# Patient Record
Sex: FEMALE | Race: ASIAN | NOT HISPANIC OR LATINO | URBAN - METROPOLITAN AREA
[De-identification: names, ages, dates, MRNs, and addresses within clinical notes are randomized per-mention and may not be internally consistent; named-entity substitution may affect disease eponyms.]

---

## 2022-04-24 ENCOUNTER — EMERGENCY (EMERGENCY)
Facility: HOSPITAL | Age: 57
LOS: 1 days | Discharge: HOME | End: 2022-04-24
Attending: EMERGENCY MEDICINE | Admitting: STUDENT IN AN ORGANIZED HEALTH CARE EDUCATION/TRAINING PROGRAM
Payer: MEDICAID

## 2022-04-24 VITALS
HEART RATE: 58 BPM | DIASTOLIC BLOOD PRESSURE: 70 MMHG | RESPIRATION RATE: 18 BRPM | SYSTOLIC BLOOD PRESSURE: 141 MMHG | OXYGEN SATURATION: 100 % | TEMPERATURE: 98 F

## 2022-04-24 VITALS
HEART RATE: 51 BPM | SYSTOLIC BLOOD PRESSURE: 128 MMHG | RESPIRATION RATE: 16 BRPM | TEMPERATURE: 98 F | OXYGEN SATURATION: 100 % | DIASTOLIC BLOOD PRESSURE: 62 MMHG

## 2022-04-24 LAB
ALBUMIN SERPL ELPH-MCNC: 4.6 G/DL — SIGNIFICANT CHANGE UP (ref 3.3–5)
ALP SERPL-CCNC: 118 U/L — SIGNIFICANT CHANGE UP (ref 40–120)
ALT FLD-CCNC: 15 U/L — SIGNIFICANT CHANGE UP (ref 4–33)
ANION GAP SERPL CALC-SCNC: 12 MMOL/L — SIGNIFICANT CHANGE UP (ref 7–14)
APPEARANCE UR: CLEAR — SIGNIFICANT CHANGE UP
AST SERPL-CCNC: 18 U/L — SIGNIFICANT CHANGE UP (ref 4–32)
BACTERIA # UR AUTO: NEGATIVE — SIGNIFICANT CHANGE UP
BASOPHILS # BLD AUTO: 0.07 K/UL — SIGNIFICANT CHANGE UP (ref 0–0.2)
BASOPHILS NFR BLD AUTO: 0.8 % — SIGNIFICANT CHANGE UP (ref 0–2)
BILIRUB SERPL-MCNC: 0.3 MG/DL — SIGNIFICANT CHANGE UP (ref 0.2–1.2)
BILIRUB UR-MCNC: NEGATIVE — SIGNIFICANT CHANGE UP
BUN SERPL-MCNC: 11 MG/DL — SIGNIFICANT CHANGE UP (ref 7–23)
CALCIUM SERPL-MCNC: 9.7 MG/DL — SIGNIFICANT CHANGE UP (ref 8.4–10.5)
CHLORIDE SERPL-SCNC: 100 MMOL/L — SIGNIFICANT CHANGE UP (ref 98–107)
CHOLEST SERPL-MCNC: 177 MG/DL — SIGNIFICANT CHANGE UP
CO2 SERPL-SCNC: 25 MMOL/L — SIGNIFICANT CHANGE UP (ref 22–31)
COLOR SPEC: COLORLESS — SIGNIFICANT CHANGE UP
CREAT SERPL-MCNC: 0.6 MG/DL — SIGNIFICANT CHANGE UP (ref 0.5–1.3)
DIFF PNL FLD: NEGATIVE — SIGNIFICANT CHANGE UP
EGFR: 104 ML/MIN/1.73M2 — SIGNIFICANT CHANGE UP
EOSINOPHIL # BLD AUTO: 0.45 K/UL — SIGNIFICANT CHANGE UP (ref 0–0.5)
EOSINOPHIL NFR BLD AUTO: 4.8 % — SIGNIFICANT CHANGE UP (ref 0–6)
FLUAV AG NPH QL: SIGNIFICANT CHANGE UP
FLUBV AG NPH QL: SIGNIFICANT CHANGE UP
GLUCOSE SERPL-MCNC: 197 MG/DL — HIGH (ref 70–99)
GLUCOSE UR QL: ABNORMAL
HCT VFR BLD CALC: 40.6 % — SIGNIFICANT CHANGE UP (ref 34.5–45)
HDLC SERPL-MCNC: 49 MG/DL — LOW
HGB BLD-MCNC: 12.9 G/DL — SIGNIFICANT CHANGE UP (ref 11.5–15.5)
IANC: 5.77 K/UL — SIGNIFICANT CHANGE UP (ref 1.8–7.4)
IMM GRANULOCYTES NFR BLD AUTO: 0.3 % — SIGNIFICANT CHANGE UP (ref 0–1.5)
KETONES UR-MCNC: NEGATIVE — SIGNIFICANT CHANGE UP
LEUKOCYTE ESTERASE UR-ACNC: NEGATIVE — SIGNIFICANT CHANGE UP
LIPID PNL WITH DIRECT LDL SERPL: 107 MG/DL — HIGH
LYMPHOCYTES # BLD AUTO: 2.01 K/UL — SIGNIFICANT CHANGE UP (ref 1–3.3)
LYMPHOCYTES # BLD AUTO: 21.7 % — SIGNIFICANT CHANGE UP (ref 13–44)
MCHC RBC-ENTMCNC: 27.6 PG — SIGNIFICANT CHANGE UP (ref 27–34)
MCHC RBC-ENTMCNC: 31.8 GM/DL — LOW (ref 32–36)
MCV RBC AUTO: 86.8 FL — SIGNIFICANT CHANGE UP (ref 80–100)
MONOCYTES # BLD AUTO: 0.95 K/UL — HIGH (ref 0–0.9)
MONOCYTES NFR BLD AUTO: 10.2 % — SIGNIFICANT CHANGE UP (ref 2–14)
NEUTROPHILS # BLD AUTO: 5.77 K/UL — SIGNIFICANT CHANGE UP (ref 1.8–7.4)
NEUTROPHILS NFR BLD AUTO: 62.2 % — SIGNIFICANT CHANGE UP (ref 43–77)
NITRITE UR-MCNC: NEGATIVE — SIGNIFICANT CHANGE UP
NON HDL CHOLESTEROL: 128 MG/DL — SIGNIFICANT CHANGE UP
NRBC # BLD: 0 /100 WBCS — SIGNIFICANT CHANGE UP
NRBC # FLD: 0 K/UL — SIGNIFICANT CHANGE UP
PH UR: 6.5 — SIGNIFICANT CHANGE UP (ref 5–8)
PLATELET # BLD AUTO: 250 K/UL — SIGNIFICANT CHANGE UP (ref 150–400)
POTASSIUM SERPL-MCNC: 3.7 MMOL/L — SIGNIFICANT CHANGE UP (ref 3.5–5.3)
POTASSIUM SERPL-SCNC: 3.7 MMOL/L — SIGNIFICANT CHANGE UP (ref 3.5–5.3)
PROT SERPL-MCNC: 8.3 G/DL — SIGNIFICANT CHANGE UP (ref 6–8.3)
PROT UR-MCNC: NEGATIVE — SIGNIFICANT CHANGE UP
RBC # BLD: 4.68 M/UL — SIGNIFICANT CHANGE UP (ref 3.8–5.2)
RBC # FLD: 13.7 % — SIGNIFICANT CHANGE UP (ref 10.3–14.5)
RBC CASTS # UR COMP ASSIST: SIGNIFICANT CHANGE UP /HPF (ref 0–4)
RSV RNA NPH QL NAA+NON-PROBE: SIGNIFICANT CHANGE UP
SARS-COV-2 RNA SPEC QL NAA+PROBE: SIGNIFICANT CHANGE UP
SODIUM SERPL-SCNC: 137 MMOL/L — SIGNIFICANT CHANGE UP (ref 135–145)
SP GR SPEC: 1 — SIGNIFICANT CHANGE UP (ref 1–1.05)
TRIGL SERPL-MCNC: 105 MG/DL — SIGNIFICANT CHANGE UP
TROPONIN T, HIGH SENSITIVITY RESULT: <6 NG/L — SIGNIFICANT CHANGE UP
TROPONIN T, HIGH SENSITIVITY RESULT: <6 NG/L — SIGNIFICANT CHANGE UP
UROBILINOGEN FLD QL: SIGNIFICANT CHANGE UP
WBC # BLD: 9.28 K/UL — SIGNIFICANT CHANGE UP (ref 3.8–10.5)
WBC # FLD AUTO: 9.28 K/UL — SIGNIFICANT CHANGE UP (ref 3.8–10.5)
WBC UR QL: SIGNIFICANT CHANGE UP /HPF (ref 0–5)

## 2022-04-24 PROCEDURE — 71046 X-RAY EXAM CHEST 2 VIEWS: CPT | Mod: 26

## 2022-04-24 PROCEDURE — 78452 HT MUSCLE IMAGE SPECT MULT: CPT | Mod: 26,ME

## 2022-04-24 PROCEDURE — 93016 CV STRESS TEST SUPVJ ONLY: CPT | Mod: GC

## 2022-04-24 PROCEDURE — 99236 HOSP IP/OBS SAME DATE HI 85: CPT

## 2022-04-24 PROCEDURE — 93306 TTE W/DOPPLER COMPLETE: CPT | Mod: 26

## 2022-04-24 PROCEDURE — 93018 CV STRESS TEST I&R ONLY: CPT | Mod: GC

## 2022-04-24 RX ORDER — ONDANSETRON 8 MG/1
4 TABLET, FILM COATED ORAL ONCE
Refills: 0 | Status: DISCONTINUED | OUTPATIENT
Start: 2022-04-24 | End: 2022-04-27

## 2022-04-24 RX ORDER — ASPIRIN/CALCIUM CARB/MAGNESIUM 324 MG
324 TABLET ORAL ONCE
Refills: 0 | Status: COMPLETED | OUTPATIENT
Start: 2022-04-24 | End: 2022-04-24

## 2022-04-24 RX ORDER — INSULIN LISPRO 100/ML
VIAL (ML) SUBCUTANEOUS AT BEDTIME
Refills: 0 | Status: DISCONTINUED | OUTPATIENT
Start: 2022-04-24 | End: 2022-04-27

## 2022-04-24 RX ORDER — AMLODIPINE BESYLATE 2.5 MG/1
5 TABLET ORAL DAILY
Refills: 0 | Status: DISCONTINUED | OUTPATIENT
Start: 2022-04-24 | End: 2022-04-27

## 2022-04-24 RX ORDER — FAMOTIDINE 10 MG/ML
20 INJECTION INTRAVENOUS ONCE
Refills: 0 | Status: COMPLETED | OUTPATIENT
Start: 2022-04-24 | End: 2022-04-24

## 2022-04-24 RX ORDER — ASPIRIN/CALCIUM CARB/MAGNESIUM 324 MG
81 TABLET ORAL DAILY
Refills: 0 | Status: DISCONTINUED | OUTPATIENT
Start: 2022-04-25 | End: 2022-04-27

## 2022-04-24 RX ORDER — ATORVASTATIN CALCIUM 80 MG/1
40 TABLET, FILM COATED ORAL DAILY
Refills: 0 | Status: DISCONTINUED | OUTPATIENT
Start: 2022-04-24 | End: 2022-04-27

## 2022-04-24 RX ORDER — INSULIN LISPRO 100/ML
VIAL (ML) SUBCUTANEOUS
Refills: 0 | Status: DISCONTINUED | OUTPATIENT
Start: 2022-04-24 | End: 2022-04-27

## 2022-04-24 RX ADMIN — Medication 30 MILLILITER(S): at 04:49

## 2022-04-24 RX ADMIN — FAMOTIDINE 20 MILLIGRAM(S): 10 INJECTION INTRAVENOUS at 04:49

## 2022-04-24 RX ADMIN — Medication 324 MILLIGRAM(S): at 04:49

## 2022-04-24 RX ADMIN — Medication 1: at 13:04

## 2022-04-24 NOTE — ED PROVIDER NOTE - NS ED ROS FT
General: +Decreased appetite. Denies fevers  Eyes: Denies vision changes  ENMT: Denies sore throat  CV: +chest pain  Resp: Denies SOB  GI: +Nausea. Denies abdominal pain, vomiting, diarrhea  : +painful urination  Skin: Denies new rashes  Neuro: Denies headache, focal weakness  MSK: Denies recent trauma

## 2022-04-24 NOTE — ED CDU PROVIDER INITIAL DAY NOTE - ATTENDING APP SHARED VISIT CONTRIBUTION OF CARE
55 y/o female with pmhx of DM on insulin and metformin, UTI, presents to ED c/o burning with urination and fever x 4 days. Pt states she has been getting UTIs every month. States she skipped her insulin today because she wasn't feeling well. No hematuria. Pt states she has been seeing her PMD for UTIs and currently taking two days of doxycycline and bactrim, does not know urine results. Pt poor historian. No weight loss. +vomiting and diarrhea. Admits to vaginal discharge x 1 month. Not sexually active.  Pt accepted to cdu for IV abx and endo consult. Hgb a1c 9.7.

## 2022-04-24 NOTE — ED ADULT NURSE REASSESSMENT NOTE - NS ED NURSE REASSESS COMMENT FT1
Daughter at bedside. Patient received from ER. Patient is Arabic speaking and wants her Daughter to translate for her. Patient stated she is feeling better and offered no complaints. Patient placed on cardiac monitor. VS stable. No distress noted. Nursing care continues

## 2022-04-24 NOTE — ED CDU PROVIDER INITIAL DAY NOTE - PHYSICAL EXAMINATION
CONSTITUTIONAL:  Well appearing, awake, alert, oriented to person, place, time/situation and in no apparent distress.  Pt. is objectively comfortable appearing and verbalizing in full, clear, effortless sentences.  ENMT: NC/AT.  Airway patent.  Nasal mucosa clear.  Moist mucous membranes.  Neck supple.  EYES:  Clear OU.  CARDIAC:  Normal rate, regular rhythm.  Heart sounds S1 S2.  No murmurs, gallops, or rubs.  RESPIRATORY:  Breath sounds clear and equal bilaterally.  No wheezes, no rales, no rhonchi.  GASTROINTESTINAL:  Abdomen soft, non-distended, non-tender.  No rebound, no guarding.  NEUROLOGICAL:  Alert and oriented to person/place/time/situation.  No focal deficits; no tremors noted.   MUSCULOSKELETAL:  Range of motion is not limited.    SKIN:  Skin color unremarkable.  Skin warm, dry, and intact.    PSYCHIATRIC:  Alert and oriented to person/place/time/situation.  Mood and affect WNL.  No apparent risk to self or others.

## 2022-04-24 NOTE — ED CDU PROVIDER INITIAL DAY NOTE - NS ED ATTENDING STATEMENT MOD
This was a shared visit with the KERI. I reviewed and verified the documentation and independently performed the documented:

## 2022-04-24 NOTE — ED PROVIDER NOTE - OBJECTIVE STATEMENT
56F PMH HTN, HLD, DM, on ASA p/w L CP, dysuria x hours. Pt reports burning L CP x hours, constant, non -radiating, non-exertional. A/w nausea, decreased appetite. No fevers, vomiting, SOB, URI sx. Also reports dysuria beginning at same time. Denies abd pain, flank pain. 56F PMH HTN, HLD, DM, on ASA p/w L CP, dysuria x hours. Pt reports burning L CP x hours (began ~7:30-8pm), constant, non -radiating, non-exertional. A/w nausea, decreased appetite. No fevers, vomiting, SOB, URI sx. Also reports dysuria beginning at same time. Denies abd pain, flank pain. Reports FH MI in siblings ~age 60s).

## 2022-04-24 NOTE — ED CDU PROVIDER DISPOSITION NOTE - NS_EDPROVIDERDISPOUSERTYPE_ED_A_ED
Pt was in the middle of her 3hr gtt and  said that they saw her leaving - pts are not supposed to leave the building during this test. Called pt and she said that she just went downstairs because the 2 TVs and the [employer] music was too much for her while in our waiting room. She states that she will be back upstairs in time for her 9:34 draw.   
Attending Attestation (For Attendings USE Only)...

## 2022-04-24 NOTE — ED CDU PROVIDER DISPOSITION NOTE - NSFOLLOWUPINSTRUCTIONS_ED_ALL_ED_FT
Rest, drink plenty of fluids.  Advance activity as tolerated.  Continue all previously prescribed medications as directed.  Follow up with your primary care physician and cardiologist in 48-72 hours- bring copies of your results. Call on referral list given for next available appointment. Return to the ER for worsening or persistent symptoms, and/or ANY NEW OR CONCERNING SYMPTOMS. If you have issues obtaining follow up, please call: [phone] to obtain a doctor or specialist who takes your insurance in your area.

## 2022-04-24 NOTE — ED CDU PROVIDER INITIAL DAY NOTE - ASSESSMENT PLAN
Telemetry/Stress Test/Echocardiogram MEDICATIONS  (PRN):  acetaminophen   Tablet .. 650 milliGRAM(s) Oral every 6 hours PRN Mild Pain (1 - 3), Moderate Pain (4 - 6)  chlorproMAZINE    Injectable 100 milliGRAM(s) IntraMuscular once PRN agitation or aggression  chlorproMAZINE    Tablet 100 milliGRAM(s) Oral every 4 hours PRN agitation or aggression  diphenhydrAMINE   Injectable 25 milliGRAM(s) IntraMuscular every 12 hours PRN EPS ppx  diphenhydrAMINE   Injectable 25 milliGRAM(s) IntraMuscular every 12 hours PRN EPS ppx  haloperidol    Injectable 5 milliGRAM(s) IntraMuscular every 12 hours PRN psychosis  haloperidol    Injectable 5 milliGRAM(s) IntraMuscular every 12 hours PRN psychosis  melatonin. 5 milliGRAM(s) Oral at bedtime PRN Insomnia  polyethylene glycol 3350 17 Gram(s) Oral daily PRN constipation  senna 2 Tablet(s) Oral at bedtime PRN constipation

## 2022-04-24 NOTE — ED CDU PROVIDER DISPOSITION NOTE - PATIENT PORTAL LINK FT
You can access the FollowMyHealth Patient Portal offered by [facility] by registering at the following website: [email or web address]. By joining [facility]’s FollowMyHealth portal, you will also be able to view your health information using other applications (apps) compatible with our system.

## 2022-04-24 NOTE — ED CDU PROVIDER DISPOSITION NOTE - ATTENDING CONTRIBUTION TO CARE
55 yo f past medical history diabetes, htn, hld presents with atypical CP as well as some dysuria. ua neg. trop neg x 2. Patient in cdu for further cardiac testing. echo and stress without significant abnormality. patient symptoms improved. patient stable for dc with out-patient follow up. Return precautions were discussed with patient at bedside and patient expressed understanding.

## 2022-04-24 NOTE — ED PROVIDER NOTE - PROGRESS NOTE DETAILS
Billy, PGY3 - pt reports improvement in CP. Initial Trop <6. Pt agreeable to staying in CDU for stress/echo, d/w CDU

## 2022-04-24 NOTE — ED CDU PROVIDER INITIAL DAY NOTE - OBJECTIVE STATEMENT
56F PMH HTN, HLD, DM, on ASA p/w L CP, dysuria x hours. Pt reports burning L CP x hours (began ~7:30-8pm), constant, non -radiating, non-exertional. A/w nausea, decreased appetite. No fevers, vomiting, SOB, URI sx. Also reports dysuria beginning at same time. Denies abd pain, flank pain. Reports FH MI in siblings ~age 60s).    CDU JAMES Maher Note------  ED Provider HPI as above, reviewed.  Pt is a 55 yo female, PMH DM, HTN, hyperlipidemia; presented to the ED c/o left chest pain, described as burning, non-radiating, and dysuria, all symptoms onset was 4/23 evening.  No hx/o fevers, chills, abdominal pain, SOB, back pain.  Pt has family hx/o MI.  In the ED, EKG sinus nathalia with no acute/ischemic findings.  Initial troponin was <6, second troponin currently testing, along with lipid profile.  CBC and CMP grossly unremarkable aside from serum glucose of 197; lipase was 88.  RSV/Flu/COVID were negative.  UA negative leuk/nitrites.  CXR with no acute pathology noted.  Pt. was dispo'd to CDU for continued care plan:  Tele monitoring, stress test, echo, Tele Doc of Day evaluation, general observation care / monitoring. 56F PMH HTN, HLD, DM, on ASA p/w L CP, dysuria x hours. Pt reports burning L CP x hours (began ~7:30-8pm), constant, non -radiating, non-exertional. A/w nausea, decreased appetite. No fevers, vomiting, SOB, URI sx. Also reports dysuria beginning at same time. Denies abd pain, flank pain. Reports FH MI in siblings ~age 60s).    CDU JAMES Maher Note------  ED Provider HPI as above, reviewed.  Pt is a 57 yo female, PMH DM, HTN, hyperlipidemia; presented to the ED c/o left chest pain, described as burning, non-radiating, and dysuria, all symptoms onset was 4/23 evening.  No hx/o fevers, chills, abdominal pain, SOB, back pain.  Pt has family hx/o MI.  In the ED, EKG sinus nathalia @ 57 bpm with no acute/ischemic findings.  Initial troponin was <6, second troponin currently testing, along with lipid profile.  CBC and CMP grossly unremarkable aside from serum glucose of 197; lipase was 88.  RSV/Flu/COVID were negative.  UA negative leuk/nitrites.  CXR with no acute pathology noted.  Pt. was dispo'd to CDU for continued care plan:  Tele monitoring, stress test, echo, Tele Doc of Day evaluation, general observation care / monitoring.

## 2022-04-24 NOTE — ED PROVIDER NOTE - PHYSICAL EXAMINATION
I have reviewed the triage vital signs.  Const: AAOx3, in NAD  Eyes: no conjunctival injection  HENT: NCAT  CV: RRR, +S1, S2  Resp: CTAB, no respiratory distress  GI: Abdomen soft, +epigastric TTP, no guarding  : no CVA tenderness  Extremities: No peripheral edema  Skin: Warm, well perfused, no rash  MSK: No gross deformities appreciated  Neuro: No focal sensory or motor deficits  Psych: Appropriate mood and affect

## 2022-04-24 NOTE — ED CDU PROVIDER INITIAL DAY NOTE - PROGRESS NOTE DETAILS
Patient signed out to me to f/u stress test & echo. stress test normal. echo no acute finding. spoke with Dr. Urbano, clearing patient for outpatient follow up. Discussed results with patient & pt's daughter at bedside. Discussed with attending, patient can be discharged home to f/u with PCP & cardiology. The patient was given verbal and written discharge instructions. Specifically, instructions when to return to the ED and when to seek follow-up from their pcp was discussed. Any specialty follow-up was discussed, including how to make an appointment.  Instructions were discussed in simple, plain language and was understood by the patient. The patient understands that should their symptoms worsen or any new symptoms arise, they should return to the ED immediately for further evaluation. All pt's questions were answered. Patient verbalizes understanding.

## 2022-04-24 NOTE — ED CDU PROVIDER DISPOSITION NOTE - CARE PROVIDER_API CALL
David Urbano)  Cardiology  [address] [place] [address]  Phone: [phone]  Fax: [phone]  Follow Up Time: Urgent

## 2022-04-24 NOTE — ED ADULT NURSE NOTE - OBJECTIVE STATEMENT
Es RN:  Pt received in spot 3, A&Ox4, NAD. Kiswahili speaking, translation services offered, pt prefers daughter for translation.  c/o epigastric pain radiating up into throat since this evening with decreased appetite, endorses fasting.  Endorses nausea but no vomiting.  Denies any dizziness/lightheadedness but states feels generally unwell.  Denies any fevers but endorsing chills.  Endorses burning upon urination and mild urinary retention.  Sinus nathalia on CM, labs sent.  Medicated as per MD orders.  Pt well appearing.  Report given to primary RN.

## 2022-04-24 NOTE — ED PROVIDER NOTE - CLINICAL SUMMARY MEDICAL DECISION MAKING FREE TEXT BOX
Billy, PGY3 - 56F PMH HTN, HLD, DM p/w CP and dysuria x hours. EKG sinus bradycardia 57 no diagnostic ischemic changes. Plan for labs, cxr, cdu at minimum for further cardiac w/u if w/u not actoinable

## 2022-04-25 LAB
CULTURE RESULTS: SIGNIFICANT CHANGE UP
SPECIMEN SOURCE: SIGNIFICANT CHANGE UP